# Patient Record
Sex: FEMALE | Race: BLACK OR AFRICAN AMERICAN | NOT HISPANIC OR LATINO | URBAN - METROPOLITAN AREA
[De-identification: names, ages, dates, MRNs, and addresses within clinical notes are randomized per-mention and may not be internally consistent; named-entity substitution may affect disease eponyms.]

---

## 2022-10-24 ENCOUNTER — OFFICE (OUTPATIENT)
Dept: URBAN - METROPOLITAN AREA CLINIC 11 | Facility: CLINIC | Age: 22
End: 2022-10-24

## 2022-10-24 VITALS
RESPIRATION RATE: 18 BRPM | DIASTOLIC BLOOD PRESSURE: 87 MMHG | WEIGHT: 163 LBS | HEART RATE: 87 BPM | HEIGHT: 60 IN | OXYGEN SATURATION: 99 % | SYSTOLIC BLOOD PRESSURE: 130 MMHG

## 2022-10-24 DIAGNOSIS — R53.83 OTHER FATIGUE: ICD-10-CM

## 2022-10-24 DIAGNOSIS — R10.30 LOWER ABDOMINAL PAIN, UNSPECIFIED: ICD-10-CM

## 2022-10-24 DIAGNOSIS — R11.2 NAUSEA WITH VOMITING, UNSPECIFIED: ICD-10-CM

## 2022-10-24 DIAGNOSIS — R14.0 ABDOMINAL DISTENSION (GASEOUS): ICD-10-CM

## 2022-10-24 PROCEDURE — 99204 OFFICE O/P NEW MOD 45 MIN: CPT

## 2022-10-24 RX ORDER — PANTOPRAZOLE SODIUM 40 MG/1
40 TABLET, DELAYED RELEASE ORAL
Qty: 30 | Refills: 6 | Status: ACTIVE

## 2022-10-24 RX ORDER — OMEPRAZOLE 40 MG/1
40 CAPSULE, DELAYED RELEASE ORAL
Qty: 0 | Refills: 0 | Status: COMPLETED
End: 2022-10-24

## 2022-10-24 NOTE — SERVICENOTES
suspect there is a component of anxiety driving her GI symptoms as she reports symptoms are worse on days that she has to work.  She has also not been taking her PPI or Carafate. urged her to take at least her PPI daily to see if this improves her symptoms.  She reports she avoids gluten in her diet. If symptoms persist despite taking this daily at her next follow-up, will consider repeating EGD.

## 2022-10-24 NOTE — SERVICEHPINOTES
Maida   Is a pleasant 22-year-old  female who presents today after referral from her PCP.  She reports a history of migraines, fatigue, dizziness and mucus in stool x1 episode.  She also reports she has pain it 3 times in last month.  She was previously seen by GI group in [place] and believes she was told she had stomach ulcers.   She had an EGD performed in March of 2019 with findings consistent with celiac sprue, H pylori was negative,  gastric biopsy showed mild chronic superficial gastritis.  Abdominal ultrasound and HIDA scan negative, HIDA did show EF of approximately 39% (low end of normal), CBC NML. She reports she was previously on omeprazole but was switched to pantoprazole on October 3rd by her PCP.  She reports she took this for few days but then stopped that and the Carafate because they made her sleepy and nauseous.  She endorses epigastric pain before and after p.o. intake that is occurring most days and will last approximately half the day.  As far as time of day and other inciting factors, she reports this is random.  She does believe symptoms are worse on days that she has to work and is asking about a long-term work excuse. She does have associated nausea vomiting.  Vomiting occurs approximately 1 to 2 times a day on a good day but can occur up to fiber 6 times a day on a bad day.  Bad days or approximately 2 days a week.  She also endorses bloating and flatulence in reflux that occurs 1 to 2 times a week.  Her lower abdominal pain usually occurs with eating and can vary from cramping to a sharp to an 8.  Sometimes improves with bowel movement or flatulence.  With regard to bowel movements, she has 3-4 bowel movements a day that her normal color and consistency without blood.  She does report 1 episode where she thought she saw mucus in her stool.  She has taken sumatriptan for her headaches and denies NSAID use but states she does use Tylenol 3 to 4 times a week.  She denies any fever, chills, night sweats, palpitations, unintentional weight loss.  She denies any tobacco, alcohol or drug use.

## 2023-01-03 ENCOUNTER — OFFICE (OUTPATIENT)
Dept: URBAN - METROPOLITAN AREA CLINIC 11 | Facility: CLINIC | Age: 23
End: 2023-01-03